# Patient Record
Sex: FEMALE | Race: OTHER | Employment: UNEMPLOYED | URBAN - METROPOLITAN AREA
[De-identification: names, ages, dates, MRNs, and addresses within clinical notes are randomized per-mention and may not be internally consistent; named-entity substitution may affect disease eponyms.]

---

## 2017-04-08 ENCOUNTER — OFFICE VISIT (OUTPATIENT)
Dept: FAMILY MEDICINE CLINIC | Age: 22
End: 2017-04-08

## 2017-04-08 VITALS
SYSTOLIC BLOOD PRESSURE: 162 MMHG | BODY MASS INDEX: 37.2 KG/M2 | DIASTOLIC BLOOD PRESSURE: 109 MMHG | HEART RATE: 79 BPM | WEIGHT: 208 LBS | TEMPERATURE: 97.7 F

## 2017-04-08 DIAGNOSIS — I77.3 FIBROMUSCULAR DYSPLASIA OF RENAL ARTERY (HCC): ICD-10-CM

## 2017-04-08 DIAGNOSIS — I15.0 RENOVASCULAR HYPERTENSION: ICD-10-CM

## 2017-04-08 DIAGNOSIS — E11.8 TYPE 2 DIABETES MELLITUS WITH COMPLICATION, WITHOUT LONG-TERM CURRENT USE OF INSULIN (HCC): Primary | ICD-10-CM

## 2017-04-08 LAB — GLUCOSE POC: NORMAL MG/DL

## 2017-04-08 RX ORDER — LISINOPRIL 20 MG/1
40 TABLET ORAL DAILY
Qty: 180 TAB | Refills: 1 | Status: SHIPPED | OUTPATIENT
Start: 2017-04-08

## 2017-04-08 RX ORDER — LISINOPRIL AND HYDROCHLOROTHIAZIDE 12.5; 2 MG/1; MG/1
1 TABLET ORAL DAILY
Qty: 90 TAB | Refills: 0 | Status: SHIPPED | OUTPATIENT
Start: 2017-04-08 | End: 2017-04-08

## 2017-04-08 RX ORDER — AMLODIPINE BESYLATE 5 MG/1
5 TABLET ORAL DAILY
Qty: 90 TAB | Refills: 0 | Status: SHIPPED | OUTPATIENT
Start: 2017-04-08 | End: 2017-04-08

## 2017-04-08 NOTE — PROGRESS NOTES
Assessment/Plan:       ICD-10-CM ICD-9-CM    1. Type 2 diabetes mellitus with complication, without long-term current use of insulin (HCC) E11.8 250.90 AMB POC GLUCOSE BLOOD, BY GLUCOSE MONITORING DEVICE      DISCONTINUED: lisinopril-hydroCHLOROthiazide (PRINZIDE, ZESTORETIC) 20-12.5 mg per tablet   2. Renovascular hypertension I15.0 405.91 lisinopril (PRINIVIL, ZESTRIL) 20 mg tablet      DISCONTINUED: amLODIPine (NORVASC) 5 mg tablet   3. Fibromuscular dysplasia of renal artery (HCC) I77.89 447.3 DISCONTINUED: amLODIPine (NORVASC) 5 mg tablet     Follow-up Disposition:  Return in about 5 months (around 9/8/2017) for hypertension-evaluate 40mg lisinopril. [facility] [place]  Subjective:   Balaji Reis is a 24 y.o. OTHER female who speaks [place]. [place]  Chief Complaint   Patient presents with    Hypertension     f/u    Medication Refill     check up for diabetes    History of Present Illness: has been depressed but in the last month she listened to a friend who recommended Lex[employer], and she is exercising frequently. We reviewed how well her glucose is doing and how her current weight (she started at 220), maintained with exercise and diet, is the proper treatment to prevent or delay diabetes. Review of Systems: Negative for: fever, chest pain, shortness of breath, leg swelling, exertional dyspnea, palpitations. Past Surgical History: She  has a past surgical history that includes cholecystectomy (1-11-13) and other surgical.   Social History: She  reports that she has never smoked. She has never used smokeless tobacco. She reports that she does not drink alcohol or use illicit drugs. Objective:     Vitals:    04/08/17 0945 04/08/17 0949   BP: (!) 155/102 (!) 162/109   Pulse: 90 79   Temp: 97.7 °F (36.5 °C)    TempSrc: Oral    Weight: 208 lb (94.3 kg)     Patient's last menstrual period was 04/03/2017. No birth control; however, not sexually active.   -  Wt Readings from Last 2 Encounters:   04/08/17 208 lb (94.3 kg)   12/01/15 210 lb 1.6 oz (95.3 kg)     Lab Review:  Results for orders placed or performed in visit on 04/08/17   AMB POC GLUCOSE BLOOD, BY GLUCOSE MONITORING DEVICE   Result Value Ref Range    Glucose POC non fasting 97 mg/dL   mornings: 90s, 80s; usu 130s or 140s after but if she eats something bad it gets as high as 150. She is exercising. Physical Examination:   General appearance - well developed, no acute distress. Chest - clear to auscultation. Heart - regular rate and rhythm without murmurs, rubs, or gallops. Abdomen - bowel sounds present x 4, NT, ND. Extremities - pulses intact. No peripheral edema. Assessment/Plan:   Chinmay Saxena was seen today for hypertension and medication refill. Diagnoses and all orders for this visit:    Type 2 diabetes mellitus with complication, without long-term current use of insulin ([facility])  -     AMB POC GLUCOSE BLOOD, BY GLUCOSE MONITORING DEVICE    Renovascular hypertension  -     lisinopril (PRINIVIL, ZESTRIL) 20 mg tablet; Take 2 Tabs by mouth daily. She took 40mg lisinopril daily in [place] and it worked well, would like to try that again. not desiring pregnancy; not sexually active  Follow-up Disposition:  Return in about 5 months (around 9/8/2017) for hypertension-evaluate 40mg lisinopril. Sofy Jacome, MSN, RN, FNP-BC, BC-ADM  Zena Cabot expressed understanding of this plan.

## 2017-04-08 NOTE — PROGRESS NOTES
Blanco Salazar seen at d/c, given AVS and reviewed today's visit with patient. Meds reviewed with patient including change to lisinopril dose. Patient indicated understanding of returning to [place] [id number] for a f/u in 5 months, no appointment. This has been fully explained to the patient, who indicates understanding.

## 2017-04-08 NOTE — PROGRESS NOTES
Coordination of Care  1. Have you been to the ER, urgent care clinic since your last visit? Hospitalized since your last visit? No    2. Have you seen or consulted any other health care providers outside of the [address] [place] [address] since your last visit? Include any pap smears or colon screening.  Yes When: 2016 Where: [place] Reason for visit: bp and sugar issues    Medications  Medication Reconciliation Performed: no  Patient does need refills     Learning Assessment Complete? yes  Results for orders placed or performed in visit on 04/08/17   AMB POC GLUCOSE BLOOD, BY GLUCOSE MONITORING DEVICE   Result Value Ref Range    Glucose POC non fasting 97 mg/dL

## 2020-10-09 ENCOUNTER — TELEPHONE (OUTPATIENT)
Dept: FAMILY MEDICINE CLINIC | Age: 25
End: 2020-10-09

## 2020-10-09 NOTE — TELEPHONE ENCOUNTER
----- Message from Ingrid Cid sent at 10/9/2020  2:25 PM EDT -----  Regarding: office  General Message/Vendor Calls    Caller's first and last name:      Reason for call: Np would like to schedule an appt with provider for HBP and Diabetes      Callback required yes/no and why: yes      Best contact number(s):[phone] [facility]      Details to clarify the request:      Ingrid Cid